# Patient Record
Sex: FEMALE | Race: WHITE | NOT HISPANIC OR LATINO
[De-identification: names, ages, dates, MRNs, and addresses within clinical notes are randomized per-mention and may not be internally consistent; named-entity substitution may affect disease eponyms.]

---

## 2017-07-25 ENCOUNTER — APPOINTMENT (OUTPATIENT)
Dept: ORTHOPEDIC SURGERY | Facility: CLINIC | Age: 52
End: 2017-07-25

## 2017-07-25 VITALS
HEIGHT: 60 IN | WEIGHT: 196 LBS | BODY MASS INDEX: 38.48 KG/M2 | HEART RATE: 96 BPM | SYSTOLIC BLOOD PRESSURE: 112 MMHG | DIASTOLIC BLOOD PRESSURE: 65 MMHG

## 2017-07-25 RX ORDER — MELOXICAM 15 MG/1
15 TABLET ORAL DAILY
Qty: 30 | Refills: 0 | Status: ACTIVE | COMMUNITY
Start: 2017-07-25 | End: 1900-01-01

## 2017-08-23 ENCOUNTER — APPOINTMENT (OUTPATIENT)
Dept: ORTHOPEDIC SURGERY | Facility: CLINIC | Age: 52
End: 2017-08-23
Payer: COMMERCIAL

## 2017-08-23 VITALS
HEART RATE: 92 BPM | BODY MASS INDEX: 38.48 KG/M2 | WEIGHT: 196 LBS | SYSTOLIC BLOOD PRESSURE: 128 MMHG | DIASTOLIC BLOOD PRESSURE: 80 MMHG | HEIGHT: 60 IN

## 2017-08-23 DIAGNOSIS — M18.12 UNILATERAL PRIMARY OSTEOARTHRITIS OF FIRST CARPOMETACARPAL JOINT, LEFT HAND: ICD-10-CM

## 2017-08-23 PROCEDURE — 99214 OFFICE O/P EST MOD 30 MIN: CPT | Mod: 25

## 2017-08-23 PROCEDURE — 20605 DRAIN/INJ JOINT/BURSA W/O US: CPT | Mod: LT

## 2018-08-05 ENCOUNTER — EMERGENCY (EMERGENCY)
Facility: HOSPITAL | Age: 53
LOS: 1 days | End: 2018-08-05
Attending: EMERGENCY MEDICINE
Payer: COMMERCIAL

## 2018-08-05 VITALS
SYSTOLIC BLOOD PRESSURE: 129 MMHG | OXYGEN SATURATION: 97 % | TEMPERATURE: 99 F | RESPIRATION RATE: 17 BRPM | HEART RATE: 72 BPM | DIASTOLIC BLOOD PRESSURE: 76 MMHG

## 2018-08-05 VITALS
WEIGHT: 190.04 LBS | SYSTOLIC BLOOD PRESSURE: 111 MMHG | TEMPERATURE: 98 F | HEART RATE: 77 BPM | RESPIRATION RATE: 18 BRPM | OXYGEN SATURATION: 100 % | HEIGHT: 60 IN | DIASTOLIC BLOOD PRESSURE: 75 MMHG

## 2018-08-05 DIAGNOSIS — Z98.51 TUBAL LIGATION STATUS: Chronic | ICD-10-CM

## 2018-08-05 DIAGNOSIS — D50.0 IRON DEFICIENCY ANEMIA SECONDARY TO BLOOD LOSS (CHRONIC): ICD-10-CM

## 2018-08-05 LAB
ALBUMIN SERPL ELPH-MCNC: 4.3 G/DL — SIGNIFICANT CHANGE UP (ref 3.3–5)
ALP SERPL-CCNC: 57 U/L — SIGNIFICANT CHANGE UP (ref 40–120)
ALT FLD-CCNC: 18 U/L — SIGNIFICANT CHANGE UP (ref 10–45)
ANION GAP SERPL CALC-SCNC: 12 MMOL/L — SIGNIFICANT CHANGE UP (ref 5–17)
ANISOCYTOSIS BLD QL: SLIGHT — SIGNIFICANT CHANGE UP
APTT BLD: 27 SEC — LOW (ref 27.5–37.4)
AST SERPL-CCNC: 24 U/L — SIGNIFICANT CHANGE UP (ref 10–40)
BASOPHILS # BLD AUTO: 0.1 K/UL — SIGNIFICANT CHANGE UP (ref 0–0.2)
BASOPHILS # BLD AUTO: 0.1 K/UL — SIGNIFICANT CHANGE UP (ref 0–0.2)
BASOPHILS NFR BLD AUTO: 0.9 % — SIGNIFICANT CHANGE UP (ref 0–2)
BILIRUB SERPL-MCNC: 0.6 MG/DL — SIGNIFICANT CHANGE UP (ref 0.2–1.2)
BLD GP AB SCN SERPL QL: NEGATIVE — SIGNIFICANT CHANGE UP
BUN SERPL-MCNC: 12 MG/DL — SIGNIFICANT CHANGE UP (ref 7–23)
CALCIUM SERPL-MCNC: 8.9 MG/DL — SIGNIFICANT CHANGE UP (ref 8.4–10.5)
CHLORIDE SERPL-SCNC: 103 MMOL/L — SIGNIFICANT CHANGE UP (ref 96–108)
CO2 SERPL-SCNC: 22 MMOL/L — SIGNIFICANT CHANGE UP (ref 22–31)
CREAT SERPL-MCNC: 0.81 MG/DL — SIGNIFICANT CHANGE UP (ref 0.5–1.3)
DACRYOCYTES BLD QL SMEAR: SLIGHT — SIGNIFICANT CHANGE UP
EOSINOPHIL # BLD AUTO: 0.2 K/UL — SIGNIFICANT CHANGE UP (ref 0–0.5)
EOSINOPHIL # BLD AUTO: 0.3 K/UL — SIGNIFICANT CHANGE UP (ref 0–0.5)
EOSINOPHIL NFR BLD AUTO: 2 % — SIGNIFICANT CHANGE UP (ref 0–6)
EOSINOPHIL NFR BLD AUTO: 3 % — SIGNIFICANT CHANGE UP (ref 0–6)
GAS PNL BLDV: SIGNIFICANT CHANGE UP
GLUCOSE SERPL-MCNC: 91 MG/DL — SIGNIFICANT CHANGE UP (ref 70–99)
HCT VFR BLD CALC: 21 % — CRITICAL LOW (ref 34.5–45)
HCT VFR BLD CALC: 26.3 % — LOW (ref 34.5–45)
HGB BLD-MCNC: 6.4 G/DL — CRITICAL LOW (ref 11.5–15.5)
HGB BLD-MCNC: 8.8 G/DL — LOW (ref 11.5–15.5)
HYPOCHROMIA BLD QL: SIGNIFICANT CHANGE UP
INR BLD: 1.06 RATIO — SIGNIFICANT CHANGE UP (ref 0.88–1.16)
LYMPHOCYTES # BLD AUTO: 2 K/UL — SIGNIFICANT CHANGE UP (ref 1–3.3)
LYMPHOCYTES # BLD AUTO: 2.6 K/UL — SIGNIFICANT CHANGE UP (ref 1–3.3)
LYMPHOCYTES # BLD AUTO: 29 % — SIGNIFICANT CHANGE UP (ref 13–44)
LYMPHOCYTES # BLD AUTO: 30.3 % — SIGNIFICANT CHANGE UP (ref 13–44)
MCHC RBC-ENTMCNC: 17.8 PG — LOW (ref 27–34)
MCHC RBC-ENTMCNC: 22.2 PG — LOW (ref 27–34)
MCHC RBC-ENTMCNC: 30.2 GM/DL — LOW (ref 32–36)
MCHC RBC-ENTMCNC: 33.5 GM/DL — SIGNIFICANT CHANGE UP (ref 32–36)
MCV RBC AUTO: 58.7 FL — LOW (ref 80–100)
MCV RBC AUTO: 66.4 FL — LOW (ref 80–100)
MICROCYTES BLD QL: SIGNIFICANT CHANGE UP
MONOCYTES # BLD AUTO: 0.5 K/UL — SIGNIFICANT CHANGE UP (ref 0–0.9)
MONOCYTES # BLD AUTO: 0.7 K/UL — SIGNIFICANT CHANGE UP (ref 0–0.9)
MONOCYTES NFR BLD AUTO: 7.1 % — SIGNIFICANT CHANGE UP (ref 2–14)
MONOCYTES NFR BLD AUTO: 8 % — SIGNIFICANT CHANGE UP (ref 2–14)
NEUTROPHILS # BLD AUTO: 4 K/UL — SIGNIFICANT CHANGE UP (ref 1.8–7.4)
NEUTROPHILS # BLD AUTO: 5.8 K/UL — SIGNIFICANT CHANGE UP (ref 1.8–7.4)
NEUTROPHILS NFR BLD AUTO: 58.6 % — SIGNIFICANT CHANGE UP (ref 43–77)
NEUTROPHILS NFR BLD AUTO: 61 % — SIGNIFICANT CHANGE UP (ref 43–77)
NRBC # BLD: 1 /100 — HIGH (ref 0–0)
OVALOCYTES BLD QL SMEAR: SIGNIFICANT CHANGE UP
PLAT MORPH BLD: NORMAL — SIGNIFICANT CHANGE UP
PLATELET # BLD AUTO: 518 K/UL — HIGH (ref 150–400)
PLATELET # BLD AUTO: 626 K/UL — HIGH (ref 150–400)
POIKILOCYTOSIS BLD QL AUTO: SLIGHT — SIGNIFICANT CHANGE UP
POTASSIUM SERPL-MCNC: 4.1 MMOL/L — SIGNIFICANT CHANGE UP (ref 3.5–5.3)
POTASSIUM SERPL-SCNC: 4.1 MMOL/L — SIGNIFICANT CHANGE UP (ref 3.5–5.3)
PROT SERPL-MCNC: 7.1 G/DL — SIGNIFICANT CHANGE UP (ref 6–8.3)
PROTHROM AB SERPL-ACNC: 11.5 SEC — SIGNIFICANT CHANGE UP (ref 9.8–12.7)
RBC # BLD: 3.58 M/UL — LOW (ref 3.8–5.2)
RBC # BLD: 3.97 M/UL — SIGNIFICANT CHANGE UP (ref 3.8–5.2)
RBC # FLD: 18 % — HIGH (ref 10.3–14.5)
RBC # FLD: 25.1 % — HIGH (ref 10.3–14.5)
RBC BLD AUTO: ABNORMAL
RH IG SCN BLD-IMP: POSITIVE — SIGNIFICANT CHANGE UP
SODIUM SERPL-SCNC: 137 MMOL/L — SIGNIFICANT CHANGE UP (ref 135–145)
WBC # BLD: 6.8 K/UL — SIGNIFICANT CHANGE UP (ref 3.8–10.5)
WBC # BLD: 9.5 K/UL — SIGNIFICANT CHANGE UP (ref 3.8–10.5)
WBC # FLD AUTO: 6.8 K/UL — SIGNIFICANT CHANGE UP (ref 3.8–10.5)
WBC # FLD AUTO: 9.5 K/UL — SIGNIFICANT CHANGE UP (ref 3.8–10.5)

## 2018-08-05 PROCEDURE — 76830 TRANSVAGINAL US NON-OB: CPT

## 2018-08-05 PROCEDURE — 82803 BLOOD GASES ANY COMBINATION: CPT

## 2018-08-05 PROCEDURE — 76856 US EXAM PELVIC COMPLETE: CPT

## 2018-08-05 PROCEDURE — 93005 ELECTROCARDIOGRAM TRACING: CPT

## 2018-08-05 PROCEDURE — 76830 TRANSVAGINAL US NON-OB: CPT | Mod: 26

## 2018-08-05 PROCEDURE — 84295 ASSAY OF SERUM SODIUM: CPT

## 2018-08-05 PROCEDURE — G0378: CPT

## 2018-08-05 PROCEDURE — 86923 COMPATIBILITY TEST ELECTRIC: CPT

## 2018-08-05 PROCEDURE — 80053 COMPREHEN METABOLIC PANEL: CPT

## 2018-08-05 PROCEDURE — 82947 ASSAY GLUCOSE BLOOD QUANT: CPT

## 2018-08-05 PROCEDURE — 76856 US EXAM PELVIC COMPLETE: CPT | Mod: 26

## 2018-08-05 PROCEDURE — 86900 BLOOD TYPING SEROLOGIC ABO: CPT

## 2018-08-05 PROCEDURE — P9016: CPT

## 2018-08-05 PROCEDURE — 82330 ASSAY OF CALCIUM: CPT

## 2018-08-05 PROCEDURE — 85014 HEMATOCRIT: CPT

## 2018-08-05 PROCEDURE — 36430 TRANSFUSION BLD/BLD COMPNT: CPT

## 2018-08-05 PROCEDURE — 84132 ASSAY OF SERUM POTASSIUM: CPT

## 2018-08-05 PROCEDURE — 83605 ASSAY OF LACTIC ACID: CPT

## 2018-08-05 PROCEDURE — 82435 ASSAY OF BLOOD CHLORIDE: CPT

## 2018-08-05 PROCEDURE — 93010 ELECTROCARDIOGRAM REPORT: CPT | Mod: 59

## 2018-08-05 PROCEDURE — 86850 RBC ANTIBODY SCREEN: CPT

## 2018-08-05 PROCEDURE — 85730 THROMBOPLASTIN TIME PARTIAL: CPT

## 2018-08-05 PROCEDURE — 85610 PROTHROMBIN TIME: CPT

## 2018-08-05 PROCEDURE — 99285 EMERGENCY DEPT VISIT HI MDM: CPT | Mod: 25

## 2018-08-05 PROCEDURE — 85027 COMPLETE CBC AUTOMATED: CPT

## 2018-08-05 PROCEDURE — 99218: CPT

## 2018-08-05 PROCEDURE — 86901 BLOOD TYPING SEROLOGIC RH(D): CPT

## 2018-08-05 RX ORDER — SODIUM CHLORIDE 9 MG/ML
3 INJECTION INTRAMUSCULAR; INTRAVENOUS; SUBCUTANEOUS EVERY 12 HOURS
Qty: 0 | Refills: 0 | Status: DISCONTINUED | OUTPATIENT
Start: 2018-08-05 | End: 2018-08-09

## 2018-08-05 RX ORDER — SODIUM CHLORIDE 9 MG/ML
1000 INJECTION INTRAMUSCULAR; INTRAVENOUS; SUBCUTANEOUS ONCE
Qty: 0 | Refills: 0 | Status: COMPLETED | OUTPATIENT
Start: 2018-08-05 | End: 2018-08-05

## 2018-08-05 RX ADMIN — SODIUM CHLORIDE 3 MILLILITER(S): 9 INJECTION INTRAMUSCULAR; INTRAVENOUS; SUBCUTANEOUS at 15:16

## 2018-08-05 RX ADMIN — SODIUM CHLORIDE 1500 MILLILITER(S): 9 INJECTION INTRAMUSCULAR; INTRAVENOUS; SUBCUTANEOUS at 11:45

## 2018-08-05 NOTE — ED PROVIDER NOTE - MEDICAL DECISION MAKING DETAILS
Heavy vag bleeding with anemia, check labs, pelvic sono,   Eliu Kaufman MD, Facep Pt is a 54 yo F with a history of menorrhagia present to the ED because of an a low Hb of 6.1 taken at her ob/gyn on friday. Report chronic fatigue. CHeck cbc, cmp u/s pelvic, pelvis exam vbg, ns iv, possible blood transfusion if H/H is below 7: Aluko Gift  Heavy vag bleeding with anemia, check labs, pelvic sono,   Eliu Kaufman MD, Facep

## 2018-08-05 NOTE — ED ADULT NURSE NOTE - NSIMPLEMENTINTERV_GEN_ALL_ED
Implemented All Universal Safety Interventions:  [place] to call system. Call bell, personal items and telephone within reach. Instruct patient to call for assistance. Room bathroom lighting operational. Non-slip footwear when patient is off stretcher. Physically safe environment: no spills, clutter or unnecessary equipment. Stretcher in lowest position, wheels locked, appropriate side rails in place.

## 2018-08-05 NOTE — ED ADULT NURSE REASSESSMENT NOTE - COMFORT CARE
repositioned/warm blanket provided/darkened lights/plan of care explained/po fluids offered/ambulated to bathroom

## 2018-08-05 NOTE — ED ADULT NURSE REASSESSMENT NOTE - GENERAL PATIENT STATE
smiling/interactive/resting/sleeping/comfortable appearance/cooperative/improvement verbalized/family/SO at bedside

## 2018-08-05 NOTE — ED PROVIDER NOTE - OBJECTIVE STATEMENT
Pt is a 54 yo F with a history of menorrhagia present to the ED because of an a low Hb of 6.1 taken at her ob/gyn on friday. pt state that she received a message from her OB/GYN Dr. Loreta Rowland instructing her to go to the ED. Pt report that she has always had heavy bleed during her menstrual cycle but this month has been worse using 8 pad per day for 3 days compared to her usual 3-4 pads per day. Pt periods last normally for 7 days. She also states the she has chronic fatigue. Pt denies fever, chill, weight loss, headache, dizziness, chest pain, SOB, abdominal pain, blood in stool or urine weakness or numbness in limbs.

## 2018-08-05 NOTE — CONSULT NOTE ADULT - ATTENDING COMMENTS
54yo P3 perimenopausal F with longstanding history of menorrhagia found to be anemic on outpatient bloodwork.  Recevied 2u pRBCs with appropriate rise.  Currently towards the end of her period, no acute hemorrhage.  Pt stable for discharge, will f/u w PCP for further workup and colonoscopy and with Dr. Rowland (gyn) for long term tx of menorrhagia.

## 2018-08-05 NOTE — ED PROVIDER NOTE - ATTENDING CONTRIBUTION TO CARE
Private Physician Samy Rowland PCP  53y female, PMH neg, no habits. travel,med, med allergy. Pt comes to ed complains of low H&H. on cbc two days ago. Pt complains of fatigue. Has current menses with clots.. No anticoagulants. Menses not unusual. Private Physician Samy Rowland PCP  53y female, PMH neg, no habits. travel,med, med allergy. Pt comes to ed complains of low H&H. on cbc two days ago. Pt complains of fatigue. Has current menses with clots.. No anticoagulants. Menses not unusual. No cp.sob, nvdc, not pregnant. fever and chills,cough. PE WDWN female nad normocephalic atraumatic chest clear anterior & posterior abd soft +bs no mass guarding. Neruo no focal defects.  cv no rubs, gallops or murmurs  Eliu Kaufman MD, Facep

## 2018-08-05 NOTE — CONSULT NOTE ADULT - ASSESSMENT
54 yo perimenopausal woman presenting with fatigue and menorrhagia. H/H 6.4/21.0. VS normotensive, non-tachycardic. Vaginal exam shows no evidence of aborting myoma or brisk vaginal bleeding.

## 2018-08-05 NOTE — CONSULT NOTE ADULT - SUBJECTIVE AND OBJECTIVE BOX
R2 Consult Note    53y P3 perimenopausal woman presents with fatigue and heavy vaginal bleeding x6 days. LMP 18. She was sent in by her primary GYN Dr. Tang after H/H in office was found to be  this week. She states that her only complaint is fatigue, which has been occurring for several months. She states with this menstrual cycle, her bleeding has been heavy, stating that she went thru 10 tampons per day and noticed large clots. Bleeding has improved over the past two days. She denies lightheadedness, dizziness, HA, CP, SOB, palpitations, abdominal pain, N/V, LE tenderness.    She states that she has experienced menorrhagia with her past few menstrual cycles. Cycles are becoming slightly farther apart q6-8 weeks over the past 6 months.       OB/GYN HISTORY:   [place]: 3  Parity: 3  Term: 3     ,   C/S, BTL   : 0  MAB/TAB/Ectopic: 0  Living: 3    Last Menstrual Period 18    Name of GYN Physician: Dr. Rowland       PAST MEDICAL & SURGICAL HISTORY:  No pertinent past medical history  History of bilateral tubal ligation      REVIEW OF SYSTEMS  General:	  Respiratory and Thorax:  Cardiovascular:	  Gastrointestinal:	  Genitourinary:	    MEDICATIONS  (STANDING):    MEDICATIONS  (PRN):      Allergies    No Known Allergies    Intolerances        SOCIAL HISTORY: occasional ETOH use, denies tobacco and drug use    FAMILY HISTORY: Father - DM, HTN. Mother - DM, HLD, lung cancer      Vital Signs Last 24 Hrs  T(C): 36.4 (05 Aug 2018 10:01), Max: 36.4 (05 Aug 2018 10:01)  T(F): 97.5 (05 Aug 2018 10:01), Max: 97.5 (05 Aug 2018 10:01)  HR: 77 (05 Aug 2018 10:01) (77 - 77)  BP: 111/75 (05 Aug 2018 10:01) (111/75 - 111/75)  BP(mean): --  RR: 18 (05 Aug 2018 10:01) (18 - 18)  SpO2: 100% (05 Aug 2018 10:) (100% - 100%)    PHYSICAL EXAM:  Constitutional: alert and oriented x 3  Respiratory: CTA-B, symmetrical expansion  Cardiovascular: RRR, no murmurs, S1, S2  Gastrointestinal: soft, non tender, + bowel sounds. No hepatosplenomegaly, no palpable masses  Genitourinary: NEFG, small amount of dark red blood in vaginal vault  Cervix: closed/ long, no CMT  Uterus: 10 week size uterus, non tender  Adnexa: non tender, no palpable masses  Extremities: no LE edema and tenderness          LABS:                        6.4    6.8   )-----------( 626      ( 05 Aug 2018 11:29 )             21.0     08-    137  |  103  |  12  ----------------------------<  91  4.1   |  22  |  0.81    Ca    8.9      05 Aug 2018 11:29    TPro  7.1  /  Alb  4.3  /  TBili  0.6  /  DBili  x   /  AST  24  /  ALT  18  /  AlkPhos  57  08-05    PT/INR - ( 05 Aug 2018 11:29 )   PT: 11.5 sec;   INR: 1.06 ratio         PTT - ( 05 Aug 2018 11:29 )  PTT:27.0 sec R2 Consult Note    53y P3 perimenopausal woman presents with fatigue and heavy vaginal bleeding x6 days. LMP 18. She was sent in by her primary GYN Dr. Tang after H/H in office was found to be  this week. She states that her only complaint is fatigue, which has been occurring for several months. She states with this menstrual cycle, her bleeding has been heavy, stating that she went thru 10 tampons per day and noticed large clots. Bleeding has improved over the past two days. She denies lightheadedness, dizziness, HA, CP, SOB, palpitations, abdominal pain, N/V, LE tenderness.    She states that she has experienced menorrhagia with her past few menstrual cycles. Cycles are becoming slightly farther apart q6-8 weeks over the past 6 months.       OB/GYN HISTORY:   [place]: 3  Parity: 3  Term: 3     ,   C/S, BTL   : 0  MAB/TAB/Ectopic: 0  Living: 3    Last Menstrual Period 18    Name of GYN Physician: Dr. Rowland  GYN Hx of fibroids. Denies ovarian cysts, STIs, abnl paps     PAST MEDICAL & SURGICAL HISTORY:  No pertinent past medical history  History of bilateral tubal ligation      REVIEW OF SYSTEMS  General:	  Respiratory and Thorax:  Cardiovascular:	  Gastrointestinal:	  Genitourinary:	    MEDICATIONS  (STANDING):    MEDICATIONS  (PRN):      Allergies    No Known Allergies    Intolerances        SOCIAL HISTORY: occasional ETOH use, denies tobacco and drug use    FAMILY HISTORY: Father - DM, HTN. Mother - DM, HLD, lung cancer      Vital Signs Last 24 Hrs  T(C): 36.4 (05 Aug 2018 10:01), Max: 36.4 (05 Aug 2018 10:01)  T(F): 97.5 (05 Aug 2018 10:01), Max: 97.5 (05 Aug 2018 10:01)  HR: 77 (05 Aug 2018 10:01) (77 - 77)  BP: 111/75 (05 Aug 2018 10:) (111/75 - 111/75)  BP(mean): --  RR: 18 (05 Aug 2018 10:01) (18 - 18)  SpO2: 100% (05 Aug 2018 10:) (100% - 100%)    PHYSICAL EXAM:  Constitutional: alert and oriented x 3  Respiratory: CTA-B, symmetrical expansion  Cardiovascular: RRR, no murmurs, S1, S2  Gastrointestinal: soft, non tender, + bowel sounds. No hepatosplenomegaly, no palpable masses  Genitourinary: NEFG, small amount of dark red blood in vaginal vault  Cervix: closed/ long, no CMT  Uterus: 10 week size uterus, non tender  Adnexa: non tender, no palpable masses  Extremities: no LE edema and tenderness          LABS:                        6.4    6.8   )-----------( 626      ( 05 Aug 2018 11:29 )             21.0     08-05    137  |  103  |  12  ----------------------------<  91  4.1   |  22  |  0.81    Ca    8.9      05 Aug 2018 11:29    TPro  7.1  /  Alb  4.3  /  TBili  0.6  /  DBili  x   /  AST  24  /  ALT  18  /  AlkPhos  57  08-05    PT/INR - ( 05 Aug 2018 11:29 )   PT: 11.5 sec;   INR: 1.06 ratio         PTT - ( 05 Aug 2018 11:29 )  PTT:27.0 sec

## 2018-08-05 NOTE — ED CDU PROVIDER INITIAL DAY NOTE - PROGRESS NOTE DETAILS
Patient resting in bed comfortably. No distress, no complaints. Vital Signs Stable. Second unit of PRBC Transfusion in progress. -Pete Montoya PA-C CDU PROGRESS NOTE TAY HARRELL: Received pt from TAY MORALES at 1900 sign-out. Pt resting in stretcher in NAD. Case/plan reviewed. VSS. Pt completed 2nd unit of PRBC.  Ambulatory around unit with steady gait. S1 S2 noted, RRR, lungs CTA b/l, BS x4 with soft, nontender abdomen. Pt without complaints. Will continue to monitor overnight to repeat post transfusion CBC. CDU PROGRESS NOTE PA SHARRI: Pt resting comfortably, feeling well without complaint. NAD. Awaiting re-evaluation by GYN, repeat H/H went from 6.4/20.0 to 8.8/26.3. Pt reports using one vaginal pad filled with blood while in CDU

## 2018-08-05 NOTE — ED ADULT NURSE NOTE - OBJECTIVE STATEMENT
54 y/o F, 52 y/o F, PSH b/l tubal ligation approx 15 years ago, presents to ED c/o low H&H on o/p lab work. Pt reports she has always had heavy periods, and was talking to her MD about getting an ablation back in February but MD wanted to see labs from during a heavy period. Pt reports it is normal for her to have heavy periods and then a few (on time) where she just spots. Pt had first heavy period since Feb the past 2 months, most recent one starting on Tues 7/31. Pt reports heavy bleeding Tues, Wed, Thurs, (using > 8 pads/day plus paper towels), followed by slowing down Friday-Sunday (today). Pt reports feeling mild cramps prior to period starting (which is normal for her) and mildly tired during this period. Pt denies headache, dizziness, lightheadedness, chest pain, palpitations, cough, SOB, BUCKNER, abdominal pain, n/v/d, rectal bleeding, urinary symptoms, fevers, chills, weakness at this time.  at bedside, safety maintained.

## 2018-08-05 NOTE — CONSULT NOTE ADULT - PROBLEM SELECTOR RECOMMENDATION 9
- 2 uPRBC for acute anemia and 4 hr posttransfusion CBC to evaluate trend  - pad counts  - f/u TVUS to evaluate for fibroids   - Will re-evaluate following transfusion. Anemia likely 2/2 acute blood loss. Surgical intervention likely unnecessary at this time    d/w Dr. Clyde Medrano pgy2 - 2 uPRBC for acute anemia and 4 hr posttransfusion CBC to evaluate trend  - EKG  - pad counts  - f/u TVUS to evaluate for fibroids   - Will re-evaluate following transfusion. Anemia likely 2/2 acute blood loss. Surgical intervention likely unnecessary at this time  - Patient to have follow up outpatient for endometrial biopsy, colonoscopy and further work up for anemia    d/w Dr. Clyde Medrano pgy2

## 2018-08-05 NOTE — ED CDU PROVIDER INITIAL DAY NOTE - DETAILS
Anemia. Vaginal Bleeding  -PRBC transfusion 2 units  -Post Transfusion CBC  -GYN follow up  -Frequent Re-evaluation

## 2018-08-05 NOTE — ED CDU PROVIDER INITIAL DAY NOTE - ATTENDING CONTRIBUTION TO CARE
I have personally performed a face to face diagnostic evaluation on this patient.  I have reviewed the ACP note and agree with the history, exam, and plan of care, except as noted.  History and Exam by me shows  See ED provider note  Eliu Kaufman MD, Facep

## 2018-08-05 NOTE — ED PROVIDER NOTE - NS ED ROS FT
GENERAL: No fever or chills, EYES: no change in vision, HEENT: no trouble swallowing or speaking, CARDIAC: no chest pain, PULMONARY: no cough or SOB, GI: no abdominal pain, no nausea, no vomiting, no diarrhea or constipation, : No changes in urination, SKIN: no rashes, NEURO: no headache,  MSK: No joint pain

## 2018-08-05 NOTE — ED CDU PROVIDER DISPOSITION NOTE - CLINICAL COURSE
53 year old female w Hx of menorrhagia presents to ED for anemia. Bleeding has significantly reduced prior to arrival to ED, and is asymptomatic for anemia. 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 GYN team consulted. Sent to CDU for Blood transfusion 2 units PRBC and 4 hour post transfusion CBC. GYN plan to follow up after transfusion complete. 53 year old female w Hx of menorrhagia presents to ED for anemia. Bleeding has significantly reduced prior to arrival to ED, and is asymptomatic for anemia. 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 GYN team consulted. Sent to CDU for Blood transfusion 2 units PRBC and 4 hour post transfusion CBC. GYN plan to follow up after transfusion complete. Post transfusion CBC resulted w/ appropriate rise in H/H from 6.4/21.0 to 8.8/26.3. Patient re-evaluated by GYN at bedside, no active bleeding . She denies dizzyness, lightheadedness, palpitations, sob. Patient will call first thing in AM to make follow up appointments w/ Dr. Rowland and w/ her PCP w/in 1 week.  Pt will likely be started on Lupron for outpatient management of menorrhagia. As per GYN. c/d/w Dr. Mullins

## 2018-08-05 NOTE — ED ADULT NURSE NOTE - CHPI ED NUR SYMPTOMS NEG
no coffee grounds emesis/no fever/no abdominal pain/no pain/no vaginal discharge/no vomiting/no nausea/no back pain/no discharge

## 2018-08-05 NOTE — ED CDU PROVIDER INITIAL DAY NOTE - OBJECTIVE STATEMENT
52 yo F with a history of menorrhagia present to the ED because of an a low Hb of 6.1 taken at her ob/gyn on friday. pt state that she received a message from her OB/GYN Dr. Loreta Rowland instructing her to go to the ED. Pt report that she has always had heavy bleed during her menstrual cycle but this month has been worse using 8 pad per day for 3 days compared to her usual 3-4 pads per day. Pt periods last normally for 7 days. She also states the she has chronic fatigue. Pt denies fever, chill, weight loss, headache, dizziness, chest pain, SOB, abdominal pain, blood in stool or urine weakness or numbness in limbs.  She admits the bleeding has decreased as she is now on her 7th day of her menstrual period today.

## 2018-08-05 NOTE — ED CDU PROVIDER DISPOSITION NOTE - PLAN OF CARE
Follow up with your Primary Care Physician within the next 2-3 days  Follow up with OBGYN as directed  Bring a copy of your test results with you to your appointment  Continue your current medication regimen  Return to the Emergency Room if you experience new or worsening symptoms Follow up with your Primary Care Physician in AM to make follow up appointments w/ Dr. Rowland and w/ her PCP w/in 1 week. Bring a copy of your test results with you to your appointment  Continue your current medication regimen. Return to the Emergency Room if you experience new or worsening symptoms.

## 2018-08-05 NOTE — ED CDU PROVIDER INITIAL DAY NOTE - GENITOURINARY BLADDER
deferred- already performed by GYN - "Genitourinary: NEFG, small amount of dark red blood in vaginal vault"

## 2018-08-06 PROCEDURE — 99217: CPT

## 2018-08-06 NOTE — ED CDU PROVIDER SUBSEQUENT DAY NOTE - HISTORY
Patient re-evaluated by GYN at bedside, no active bleeding . She denies dizzyness, lightheadedness, palpitations, sob. Patient will call first thing in AM to make follow up appointments w/ Dr. Rowland and w/ her PCP w/in 1 week.  Pt will likely be started on Lupron for outpatient management of menorrhagia. As per GYN. c/d/w Dr. Mullins

## 2018-08-06 NOTE — ED CDU PROVIDER SUBSEQUENT DAY NOTE - MEDICAL DECISION MAKING DETAILS
Susanna: patient with vaginal bleeding and anemia secondary to fibroids. transfused and hgb improved. gyn cleared patient for outpatient f/u. patient feels improved.

## 2018-08-06 NOTE — ED CDU PROVIDER SUBSEQUENT DAY NOTE - ATTENDING CONTRIBUTION TO CARE
I have personally performed a face to face diagnostic evaluation on this patient.  I have reviewed the ACP note and agree with the history, exam, and plan of care, except as noted.  History and Exam by me shows  Patient with vaginal bleeding.   PE: att exam: patient awake alert NAD . LUNGS CTAB no wheeze no crackle. CARD RRR no m/r/g.  Abdomen soft NT ND no rebound no guarding no CVA tenderness. EXT WWP no edema no calf tenderness CV 2+DP/PT bilaterally. neuro A&Ox3 gait normal.  skin warm and dry no rash

## 2018-08-06 NOTE — ED ADULT NURSE REASSESSMENT NOTE - NS ED NURSE REASSESS COMMENT FT1
Pt d/c home per PA Greg, VSS no complaints, IV lock removed. D/C documentation provided to pt by PA, Ambulated out of unit independently with own belonging. Left hospital via private car with spouse.
Gyn at bedside with pt.
Pt AAOx4, NAD, resting comfortably in bed. 1 unit PRBCs started as ordered by MD. Consent in chart. Risks and benefits explained to patient. Patient verbalized understanding of risks and benefits. Patient aware of possible side effects. Vital signs stable. Second RN at bedside for confirmation. Safety maintained.
Pt is going to be transported to CDU. Pt aware of transfer of care. Pt AAOx4, NAD, resp nonlabored, resting comfortably in bed. Vital signs stable. Patient in stable condition. Safety maintained. Report called to [facility]. Pt awaiting transport to CDU.
Pt resting comfortably in room, no changes observed at this time. Pt denies headache, dizziness, lightheadedness, chest pain, palpitations, SOB, abdominal pain, n/v/d, back pain, urinary symptoms, fevers, chills, weakness at this time. Pt awaiting to go to CDU. Safety maintained.
Received pt from [facility], received pt alert and responsive, oriented x4, denies any respiratory distress, SOB, or difficulty breathing. Pt transferred to CDU for low H+H, pt received 2 U PRBC and is now pending CBC. IV in place, patent and free of signs of infiltration, pt denies chest pain or palpitations, V/S stable, pt afebrile, pt denies pain at this time. Pt educated on unit and unit rules, instructed patient to notify RN of any needed assistance, Pt verbalizes understanding, Call bell placed within reach. Safety maintained. Will continue to monitor.

## 2021-07-09 ENCOUNTER — APPOINTMENT (OUTPATIENT)
Dept: ORTHOPEDIC SURGERY | Facility: CLINIC | Age: 56
End: 2021-07-09

## 2021-07-13 ENCOUNTER — APPOINTMENT (OUTPATIENT)
Dept: ORTHOPEDIC SURGERY | Facility: CLINIC | Age: 56
End: 2021-07-13
Payer: COMMERCIAL

## 2021-07-13 VITALS
HEIGHT: 60 IN | HEART RATE: 76 BPM | WEIGHT: 200 LBS | BODY MASS INDEX: 39.27 KG/M2 | SYSTOLIC BLOOD PRESSURE: 150 MMHG | DIASTOLIC BLOOD PRESSURE: 92 MMHG

## 2021-07-13 DIAGNOSIS — Z72.3 LACK OF PHYSICAL EXERCISE: ICD-10-CM

## 2021-07-13 DIAGNOSIS — M75.41 IMPINGEMENT SYNDROME OF RIGHT SHOULDER: ICD-10-CM

## 2021-07-13 PROCEDURE — 99072 ADDL SUPL MATRL&STAF TM PHE: CPT

## 2021-07-13 PROCEDURE — 99203 OFFICE O/P NEW LOW 30 MIN: CPT | Mod: 25

## 2021-07-13 PROCEDURE — 20610 DRAIN/INJ JOINT/BURSA W/O US: CPT | Mod: RT

## 2021-07-13 RX ORDER — METHYLPRED ACET/NACL,ISO-OS/PF 40 MG/ML
40 VIAL (ML) INJECTION
Qty: 1 | Refills: 0 | Status: COMPLETED | OUTPATIENT
Start: 2021-07-13

## 2021-07-13 RX ORDER — LIDOCAINE HYDROCHLORIDE 10 MG/ML
1 INJECTION, SOLUTION INFILTRATION; PERINEURAL
Refills: 0 | Status: COMPLETED | OUTPATIENT
Start: 2021-07-13

## 2021-07-13 RX ADMIN — LIDOCAINE HYDROCHLORIDE 3 %: 10 INJECTION, SOLUTION INFILTRATION; PERINEURAL at 00:00

## 2021-07-13 RX ADMIN — METHYLPREDNISOLONE ACETATE 2 MG/ML: 40 INJECTION, SUSPENSION INTRALESIONAL; INTRAMUSCULAR; INTRASYNOVIAL; SOFT TISSUE at 00:00

## 2022-10-08 ENCOUNTER — EMERGENCY (EMERGENCY)
Facility: HOSPITAL | Age: 57
LOS: 1 days | Discharge: HOME | End: 2022-10-08
Attending: EMERGENCY MEDICINE
Payer: COMMERCIAL

## 2022-10-08 VITALS
SYSTOLIC BLOOD PRESSURE: 111 MMHG | HEART RATE: 83 BPM | DIASTOLIC BLOOD PRESSURE: 72 MMHG | OXYGEN SATURATION: 95 % | TEMPERATURE: 98 F | RESPIRATION RATE: 18 BRPM

## 2022-10-08 VITALS
OXYGEN SATURATION: 96 % | HEIGHT: 60 IN | RESPIRATION RATE: 18 BRPM | WEIGHT: 190.04 LBS | DIASTOLIC BLOOD PRESSURE: 81 MMHG | HEART RATE: 118 BPM | SYSTOLIC BLOOD PRESSURE: 138 MMHG | TEMPERATURE: 98 F

## 2022-10-08 DIAGNOSIS — Z98.51 TUBAL LIGATION STATUS: Chronic | ICD-10-CM

## 2022-10-08 LAB
ALBUMIN SERPL ELPH-MCNC: 4.5 G/DL — SIGNIFICANT CHANGE UP (ref 3.3–5)
ALP SERPL-CCNC: 82 U/L — SIGNIFICANT CHANGE UP (ref 40–120)
ALT FLD-CCNC: 15 U/L — SIGNIFICANT CHANGE UP (ref 10–45)
ANION GAP SERPL CALC-SCNC: 15 MMOL/L — SIGNIFICANT CHANGE UP (ref 5–17)
AST SERPL-CCNC: 16 U/L — SIGNIFICANT CHANGE UP (ref 10–40)
BASOPHILS # BLD AUTO: 0.06 K/UL — SIGNIFICANT CHANGE UP (ref 0–0.2)
BASOPHILS NFR BLD AUTO: 0.4 % — SIGNIFICANT CHANGE UP (ref 0–2)
BILIRUB SERPL-MCNC: 0.7 MG/DL — SIGNIFICANT CHANGE UP (ref 0.2–1.2)
BUN SERPL-MCNC: 7 MG/DL — SIGNIFICANT CHANGE UP (ref 7–23)
CALCIUM SERPL-MCNC: 9.9 MG/DL — SIGNIFICANT CHANGE UP (ref 8.4–10.5)
CHLORIDE SERPL-SCNC: 102 MMOL/L — SIGNIFICANT CHANGE UP (ref 96–108)
CO2 SERPL-SCNC: 23 MMOL/L — SIGNIFICANT CHANGE UP (ref 22–31)
CREAT SERPL-MCNC: 0.69 MG/DL — SIGNIFICANT CHANGE UP (ref 0.5–1.3)
EGFR: 102 ML/MIN/1.73M2 — SIGNIFICANT CHANGE UP
EOSINOPHIL # BLD AUTO: 0.22 K/UL — SIGNIFICANT CHANGE UP (ref 0–0.5)
EOSINOPHIL NFR BLD AUTO: 1.6 % — SIGNIFICANT CHANGE UP (ref 0–6)
FLUAV AG NPH QL: SIGNIFICANT CHANGE UP
FLUBV AG NPH QL: SIGNIFICANT CHANGE UP
GAS PNL BLDV: SIGNIFICANT CHANGE UP
GLUCOSE SERPL-MCNC: 98 MG/DL — SIGNIFICANT CHANGE UP (ref 70–99)
HCT VFR BLD CALC: 46.8 % — HIGH (ref 34.5–45)
HGB BLD-MCNC: 15.8 G/DL — HIGH (ref 11.5–15.5)
IMM GRANULOCYTES NFR BLD AUTO: 0.4 % — SIGNIFICANT CHANGE UP (ref 0–0.9)
LYMPHOCYTES # BLD AUTO: 1.91 K/UL — SIGNIFICANT CHANGE UP (ref 1–3.3)
LYMPHOCYTES # BLD AUTO: 13.6 % — SIGNIFICANT CHANGE UP (ref 13–44)
MCHC RBC-ENTMCNC: 31 PG — SIGNIFICANT CHANGE UP (ref 27–34)
MCHC RBC-ENTMCNC: 33.8 GM/DL — SIGNIFICANT CHANGE UP (ref 32–36)
MCV RBC AUTO: 91.8 FL — SIGNIFICANT CHANGE UP (ref 80–100)
MONOCYTES # BLD AUTO: 0.71 K/UL — SIGNIFICANT CHANGE UP (ref 0–0.9)
MONOCYTES NFR BLD AUTO: 5.1 % — SIGNIFICANT CHANGE UP (ref 2–14)
NEUTROPHILS # BLD AUTO: 11.05 K/UL — HIGH (ref 1.8–7.4)
NEUTROPHILS NFR BLD AUTO: 78.9 % — HIGH (ref 43–77)
NRBC # BLD: 0 /100 WBCS — SIGNIFICANT CHANGE UP (ref 0–0)
PLATELET # BLD AUTO: 461 K/UL — HIGH (ref 150–400)
POTASSIUM SERPL-MCNC: 4.4 MMOL/L — SIGNIFICANT CHANGE UP (ref 3.5–5.3)
POTASSIUM SERPL-SCNC: 4.4 MMOL/L — SIGNIFICANT CHANGE UP (ref 3.5–5.3)
PROT SERPL-MCNC: 8.2 G/DL — SIGNIFICANT CHANGE UP (ref 6–8.3)
RBC # BLD: 5.1 M/UL — SIGNIFICANT CHANGE UP (ref 3.8–5.2)
RBC # FLD: 12.2 % — SIGNIFICANT CHANGE UP (ref 10.3–14.5)
RSV RNA NPH QL NAA+NON-PROBE: SIGNIFICANT CHANGE UP
SARS-COV-2 RNA SPEC QL NAA+PROBE: SIGNIFICANT CHANGE UP
SODIUM SERPL-SCNC: 140 MMOL/L — SIGNIFICANT CHANGE UP (ref 135–145)
WBC # BLD: 14 K/UL — HIGH (ref 3.8–10.5)
WBC # FLD AUTO: 14 K/UL — HIGH (ref 3.8–10.5)

## 2022-10-08 PROCEDURE — 80053 COMPREHEN METABOLIC PANEL: CPT

## 2022-10-08 PROCEDURE — 84132 ASSAY OF SERUM POTASSIUM: CPT

## 2022-10-08 PROCEDURE — 84295 ASSAY OF SERUM SODIUM: CPT

## 2022-10-08 PROCEDURE — 82330 ASSAY OF CALCIUM: CPT

## 2022-10-08 PROCEDURE — 83605 ASSAY OF LACTIC ACID: CPT

## 2022-10-08 PROCEDURE — 99285 EMERGENCY DEPT VISIT HI MDM: CPT

## 2022-10-08 PROCEDURE — 96375 TX/PRO/DX INJ NEW DRUG ADDON: CPT

## 2022-10-08 PROCEDURE — 85014 HEMATOCRIT: CPT

## 2022-10-08 PROCEDURE — 74177 CT ABD & PELVIS W/CONTRAST: CPT | Mod: MA

## 2022-10-08 PROCEDURE — 85025 COMPLETE CBC W/AUTO DIFF WBC: CPT

## 2022-10-08 PROCEDURE — 96374 THER/PROPH/DIAG INJ IV PUSH: CPT | Mod: XU

## 2022-10-08 PROCEDURE — 85018 HEMOGLOBIN: CPT

## 2022-10-08 PROCEDURE — 82435 ASSAY OF BLOOD CHLORIDE: CPT

## 2022-10-08 PROCEDURE — 82947 ASSAY GLUCOSE BLOOD QUANT: CPT

## 2022-10-08 PROCEDURE — 74177 CT ABD & PELVIS W/CONTRAST: CPT | Mod: 26,MA

## 2022-10-08 PROCEDURE — 99284 EMERGENCY DEPT VISIT MOD MDM: CPT | Mod: 25

## 2022-10-08 PROCEDURE — 82803 BLOOD GASES ANY COMBINATION: CPT

## 2022-10-08 PROCEDURE — 87637 SARSCOV2&INF A&B&RSV AMP PRB: CPT

## 2022-10-08 RX ORDER — SODIUM CHLORIDE 9 MG/ML
1000 INJECTION INTRAMUSCULAR; INTRAVENOUS; SUBCUTANEOUS ONCE
Refills: 0 | Status: COMPLETED | OUTPATIENT
Start: 2022-10-08 | End: 2022-10-08

## 2022-10-08 RX ORDER — ONDANSETRON 8 MG/1
4 TABLET, FILM COATED ORAL ONCE
Refills: 0 | Status: COMPLETED | OUTPATIENT
Start: 2022-10-08 | End: 2022-10-08

## 2022-10-08 RX ORDER — CIPROFLOXACIN LACTATE 400MG/40ML
400 VIAL (ML) INTRAVENOUS ONCE
Refills: 0 | Status: COMPLETED | OUTPATIENT
Start: 2022-10-08 | End: 2022-10-08

## 2022-10-08 RX ORDER — CIPROFLOXACIN LACTATE 400MG/40ML
1 VIAL (ML) INTRAVENOUS
Qty: 14 | Refills: 0
Start: 2022-10-08 | End: 2022-10-14

## 2022-10-08 RX ORDER — MORPHINE SULFATE 50 MG/1
4 CAPSULE, EXTENDED RELEASE ORAL ONCE
Refills: 0 | Status: DISCONTINUED | OUTPATIENT
Start: 2022-10-08 | End: 2022-10-08

## 2022-10-08 RX ORDER — METRONIDAZOLE 500 MG
500 TABLET ORAL ONCE
Refills: 0 | Status: COMPLETED | OUTPATIENT
Start: 2022-10-08 | End: 2022-10-08

## 2022-10-08 RX ORDER — METRONIDAZOLE 500 MG
1 TABLET ORAL
Qty: 21 | Refills: 0
Start: 2022-10-08 | End: 2022-10-14

## 2022-10-08 RX ADMIN — Medication 100 MILLIGRAM(S): at 15:57

## 2022-10-08 RX ADMIN — Medication 200 MILLIGRAM(S): at 15:57

## 2022-10-08 RX ADMIN — SODIUM CHLORIDE 1000 MILLILITER(S): 9 INJECTION INTRAMUSCULAR; INTRAVENOUS; SUBCUTANEOUS at 14:53

## 2022-10-08 RX ADMIN — ONDANSETRON 4 MILLIGRAM(S): 8 TABLET, FILM COATED ORAL at 14:54

## 2022-10-08 RX ADMIN — MORPHINE SULFATE 4 MILLIGRAM(S): 50 CAPSULE, EXTENDED RELEASE ORAL at 14:54

## 2022-10-08 NOTE — ED ADULT NURSE NOTE - CINV DISCH TEACH PARTICIP
Pt remains in TR A. Lab value of 4.5 lactate noted. third liter of NS infusing at this time. 1L of LR ordered after completion.  Repeat VBG will be drawn after LR bolus. Vitals as noted.
Patient/Family

## 2022-10-08 NOTE — ED PROVIDER NOTE - PHYSICAL EXAMINATION
General: WN/WD NAD  Head: Atraumatic  Eyes: EOM grossly in tact, no scleral icterus  ENT: moist mucous membranes  Neurology: A&Ox3, nonfocal, RHOADES x 4  Respiratory: normal respiratory effort  CV: Extremities warm and well perfused  Abdominal: Mildly tendern to palpation LLQ  Extremities: No edema  Skin: No rashes

## 2022-10-08 NOTE — ED PROVIDER NOTE - PATIENT PORTAL LINK FT
You can access the FollowMyHealth Patient Portal offered by [facility] by registering at the following website: [email or web address]. By joining [facility]’s FollowMyHealth portal, you will also be able to view your health information using other applications (apps) compatible with our system.

## 2022-10-08 NOTE — ED PROVIDER NOTE - NS ED ROS FT
GENERAL: No fever or chills  EYES: No change in vision  HEENT: No trouble swallowing or speaking  PULMONARY: No cough or SOB  GI: +abdominal pain, no nausea or no vomiting, +diarrhea  : No changes in urination  NEURO: No headache  MSK: No joint pain  Otherwise as HPI or negative.

## 2022-10-08 NOTE — ED PROVIDER NOTE - OBJECTIVE STATEMENT
57 y/oF H/Oh/o diverticulitis no surgeries, P/W abd pain. States several days of LLQ pain taking augmentin for x2 days, now presenting with continued nonbloody diarrhea, denies any chest pain, difficulty breathing, hematochezia, inability to tolerate PO, dysuria.

## 2022-10-08 NOTE — ED ADULT TRIAGE NOTE - CHIEF COMPLAINT QUOTE
LLQ abd pain for few days, went to [facility] yesterday and had abd xray done and reports that ther was a lot of stool inside, was Prescribed with Augmentin which she took last Night and this morning with Tylenol. h/o Diverticulosis/diverticulitis

## 2022-10-08 NOTE — ED PROVIDER NOTE - NSFOLLOWUPINSTRUCTIONS_ED_ALL_ED_FT
1. You presented to the emergency department for:  diverticulitis    2. Your evaluation in the emergency department included a physician evaluation and testing consisting of: lab work and CT scan. Your work-up did not reveal any findings indicating the need for admission to the hospital or any emergent interventions at this time.     3. It is recommended that you follow-up with your primary care doctor within 1-2 days as discussed for a repeat evaluation, and potentially further testing and treatment.     If needed, to arrange an appointment with a primary care provider please call: [phone]    4. Please continue taking your regular medications as prescribed.     For your diverticulitis, a prescription for antibiotics is available for you to  at your pharmacy. Please read and adhere to the instructions for use available on the packaging. Additionally, please read the warnings on the packaging before use. If you have any questions regarding your prescription, you may refer them to the pharmacist.    For pain you may take 500-1000mg Tylenol every 8 hours - as needed.  This is an over-the-counter medication - please read the instructions for use and warnings on the label. If you have any questions regarding its use, you may refer them to your local pharmacist.    5. PLEASE RETURN TO THE EMERGENCY DEPARTMENT IMMEDIATELY IF you develop any fevers not responding to over the counter medications, uncontrollable nausea and vomiting, an inability to tolerate eating and drinking, difficulty breathing, chest pain, a severe increase in your symptoms or pain, or any other new symptoms that concern you.

## 2022-10-08 NOTE — ED PROVIDER NOTE - ATTENDING CONTRIBUTION TO CARE
pt is a 56 y/o female with h/o diverticulitis with no surgeries presenting with llq tend and non bloody diarrhea with no f/c, vss, llq tend on exam on outpt augmentin for a few days, ct, labs, likely tic, abx can likely be dc with outpt tx.

## 2022-11-12 ENCOUNTER — APPOINTMENT (OUTPATIENT)
Dept: ORTHOPEDIC SURGERY | Facility: CLINIC | Age: 57
End: 2022-11-12

## 2023-06-05 NOTE — ED ADULT TRIAGE NOTE - SOURCE OF INFORMATION
Patient's spouse Kike [phone] called concerned. He states he would like a call regarding patient's stent information.   Patient

## 2023-07-03 NOTE — ED CDU PROVIDER SUBSEQUENT DAY NOTE - CARDIAC, MLM
ASSESSMENT  72yo Male with pmh of hypertension, massive Grade IV hydronephrosis s/p left nephrostomy placed in May 2023 by [facility] presents to the ED with little to no urine output from LEFT nephrostomy from about a week. PCN  more recently drainage was changed to brown/purulent fluid. 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  Exploratory laparotomy 30-Jun-2023 21:35:05  Aure Prabhakar  Left nephrectomy 30-Jun-2023 21:35:11  Aure Prabhakar. "Exploratory laparotomy for possible left renal cancer complicated by significant hydronephrosis and pyelonephritis. Left nephrectomy performed, notable for necrotic bulky tissue adherent to the renal vein, staple line in tact and hemostasis achieved. Copious irrigation of retroperitoneal renal bed and intraperitoneal fibrinous exudate and purulent fluid. Three drains placed, #1 in Lerma's pouch, #2 in the pelvis, and #3 in the renal bed"    6/22 UCX  Few Finegoldia magna "Susceptibilities not performed"  Nephrostolithotomy, percutaneous, with lithotripsy, large stone 22-Jun-2023 23:27:09 left large thick abscess materia dimension of aspirate over 4 x 4 x 4 cm Bernie Perdomo  Change external ureteral stent 22-Jun-2023 23:28:03 left Bernie Perdomo  Nephrostogram 22-Jun-2023 23:28:31 left Bernie Perdomo. "thick gelatinous material that was difficult to suction out even with the shock pulse. I would alternate 2 prong to remove and break up / shock pulse to suck out. after an hour where I had no clear planes I elected to stop. irrigation through the 26 bobby took out a lot additional material  in ashlyn catch cup 4 x 4 x 4 cm lump of material trapped"    6/19 UCX NG; UA Blood: x / Protein: 300 mg/dL / Nitrite: Negative   Leuk Esterase: Large / RBC: 8 /HPF / WBC >720 /HPF   Sq Epi: x / Non Sq Epi: x / Bacteria: Moderate    6/18 L PCN     Few Stenotrophomonas maltophilia Levofloxacin: S 2    6/18 L PCN     Numerous Anaerobic Gram Positive Cocci Most closely resembling  Peptoniphilus species "Susceptibilities not performed"    6/18 L PCN   Numerous Gram positive cocci in pairs per oil power field    6/18 BCX NGTD     s/p 6/18  Left PCN upsized to 16F and 1200cc of very viscous tan colored fluid was aspirated. RLQ 16F drain was placed and 1200cc of tan colored fluid was aspirated (less viscous). A sample from each location was sent for culture.     Repeat CT 6/19 Since one day earlier,  No extraluminal contrast. Oral contrast was last seen in the terminal ileum.  Status post left nephrostomy tube placement. Left enlarged kidney with severe hydronephrosis/collection has decreased, now measuring 16 x 12 cm from 20 x 14 cm  Surgical Pathology Report:   Left renal abscess, possible parenchyma  Left renal abscess, possible parenchyma, percutaneous drainage:  -  Fragments of extensively necrotic tissue, cannot be fullycharacterized  Comment: The microscopic appearance of the necrotic tissue is concerning for a neoplasm with coagulative necrosis. Definitive diagnosis cannot be  made, due to lack of viable material.  Additional sections will be submitted.  Immunohistochemical studies willbe performed, in an attempt to characterise the necrotic tissue.  Based on the H&E appearance, an infectious process such as tuberculosis  appears less likely, but special stains for microorganisms (acid-fastbacilli and fungal) are in progress and will be reported separately.   (06.22.23 @ 22:12)  6/27 CT Study is overall very limited due to lack of intravenous contrast, beam   Willingham artifact.  Persistent bilateral pleural effusions with adjacent consolidations   likely reflecting compressive atelectasis.  Left kidney poorly defined with a heterogeneous collection with air and   fluid which has likely mildly diminished in size compared to the prior   examination but exact comparison limited.  Diffuse ascites again noted. Diffuse anasarca again noted. Persistent   pneumoperitoneum.  Poorly defined lesions within the liver adjacent to the falciform   ligament which may again reflect abscesses or masses, difficult to   evaluate due to the limitations described above.  < from: CT Abdomen and Pelvis w/ IV Cont (06.18.23 @ 15:15) >  1 ) Moderate pneumoperitoneum with partial diffusion throughout moderate   volume ascites and with associated intermittent peritoneal enhancement   and nodularity. Perforation and peritonitis may be related to underlying   emphysematous pyelonephritis (see below). Less likely sources of   perforation include ureteral/bladder disruption, and bowel perforation   which cannot be entirely excluded on the provided images.  2) Imaging findings are compatible with emphysematous pyelonephritis of   the previously described enlarged and severely hydronephrotic left kidney   with minimal residual renal parenchyma. The compressed residual renal   parenchyma is inseparable from the adjacent fascial/fatty layers   anterosuperiorly and direct rupture into the peritoneum is a possibility   (1-355). The previously placed left-sided nephrostomy tube pigtail is   notedto be within the left renal collecting system.  3) New hepatic hypodensities measuring up to 2.6 cm, suspicious for   development of multiple focal abscesses.  4) Wedge-shaped region of hypodensity within the spleen may reflect   splenic infarct in the correct clinical setting. Developing   phlegmon/abscess cannot be excluded in the current clinical setting  5) Increased extensive retroperitoneal, portacaval and likely   gastrohepatic heterogeneously enhancing lymphadenopathy. Findings may be   reactive.  6) Mediastinal lymphadenopathy measuring up to 2.4 cm short axis.   Underlying etiology may be reactive, infectious/inflammatory, or   neoplastic. A short-term 3 month follow-up CT chest should be considered   for further evaluation.  7)Right middle and upper lobe solid pulmonary nodules which are not well   delineated due to respiratory motion but measure less than 6 mm.   attention on follow-up exam.  #Reintubated, L lung white out, ruled out HAP    6/24 bronch CX NG,   Moderate Yeast- likely colonizer  #Lactic acidosis  #Abx allergy: No Known Allergies  #Prolonged QTC Calculation(Bazett) 526 ms  #Hyponatremia  #AKICreatinine: 3.3 mg/dL (06.21.23 @ 21:28)  )crcl 26  Ht 180  Weight (kg): 76.5 (06-18-23 @ 21:27)    RECOMMENDATIONS  - f/u OR cultures, path, thus far NG   - Ar 500 milliGRAM(s) IV Intermittent every 12 hours   - Continue levaquin IV 500mg q48h IV   - Trend WBC   - Anticipate antibiotics x 7 days post-op   - Urology following    If any questions, please send a message or call on [facility] [employer]  Please continue to update ID with any pertinent new laboratory or radiographic findings  #[id number]   Normal rate, regular rhythm.  Heart sounds S1, S2.  No murmurs, rubs or gallops.

## 2023-11-07 NOTE — ED ADULT NURSE NOTE - FINAL NURSING ELECTRONIC SIGNATURE
Type 2 diabetes mellitus, without long-term current use of insulin
Essential hypertension
08-Oct-2022 19:45

## 2024-11-26 NOTE — ED ADULT NURSE NOTE - OBJECTIVE STATEMENT
[facility] [address], [place] [address] office: [phone] fax: [phone]         Physical Therapy: TREATMENT/PROGRESS NOTE   Patient: Rosi Oliver (84 y.o. female)   Examination Date: 2024   :  1940 MRN: [id number]   Visit #: 12   Insurance Allowable Auth Needed   BMN []Yes    [x]No    Insurance: Payor: MEDICARE / Plan: MEDICARE PART A AND B / Product Type: *No Product type* /   Insurance ID: [id number] - (Medicare)  Secondary Insurance (if applicable): [facility]   Treatment Diagnosis:     ICD-10-CM    1. Imbalance  R26.89       2. Deficit of vestibulo-ocular reflex  H81.8X9       3. Benign paroxysmal positional vertigo, unspecified laterality  H81.10          Medical Diagnosis:  Benign paroxysmal positional vertigo, unspecified laterality [H81.10]   Referring Physician: Junior See DO  PCP: Sarita Neal MD       Plan of care signed (Y/N): Y    Date of Patient follow up with Physician: ?     Plan of Care Report: NO  POC update due: (10 visits /OR AUTH LIMITS, whichever is less)  2024                                             Medical History:  Comorbidities:  Hypertension  Other: hypothyroidism, CAD  Relevant Medical History: vertigo, pericarditis (history of)                                         Precautions/ Contra-indications:           Latex allergy:  NO  Pacemaker:    NO  Contraindications for Manipulation: NA  Date of Surgery: NA  Other:    Red Flags:  None    Suicide Screening:   The patient did not verbalize a primary behavioral concern, suicidal ideation, suicidal intent, or demonstrate suicidal behaviors.    Preferred Language for Healthcare:   [x] English       [] other:    SUBJECTIVE EXAMINATION     Patient stated complaint/comment:   Pt still notices some dizziness when she lays down, improved since changing some of her BP medications.  Pt reports feeling light headed and like her eyes 
Patient presents to Ed with c/o abd pain. Patient with hx of diverticulitis reports experiencing intermittent abd pain x 2  weeks then yesterday experienced sharp shooting pain in her lt lower quad went to urgent care and placed on Augmentin.  Patient A&Ox3 denies chest pain no sob +nausea no cough fever chills headache or dizziness. Patient denies any urinary   symptoms denies any blood in stool, +tenderness to lt lower quadrant. LFA 20g iv lock placed labs drawn and sent.
